# Patient Record
(demographics unavailable — no encounter records)

---

## 2025-03-07 NOTE — DISCUSSION/SUMMARY
[FreeTextEntry1] : A/P 65-year-old male with past medical history of dyslipidemia, diabetes (not on medications), Atrial fibrillation, [id number]: 1, status post ablation in 2019 at [facility], not on AC , last seen 6/2023 for preoperative cardiovascular assessment prior to cataract surgery. He has had 3 ER visit for chest pain. In April 2024 in [facility] underwent CCTA, mild plaque, In December 2024 he went to [facility] for chest pain and productive cough, TTE with LVEF70%, PVC/ NSVT. ESR CRP neg. was treated with Augmentin for sinusitis. Most recently went to [facility] with recurrent chest pains, Trop 15, A1c 7.5.   - non exertional chest pain, recent CCTA with minimal / mild CAD in 4/2024, normal LVEF on TTE, will order 4 week [facility]OT assess for pAFib / PVC/ NSVT, requires auth cannot be placed today.  continue ASA 81mg, pt had stopped it. mildly elevated LFT at [facility] check LFTs prior to resuming statin.  recommend diet modification, suggest trial PPI , pt does not want to take meds, advised to avoid spicy foods/ acidic foods avoid eating close to bedtime,  EP f/u -smoking history and cough- will refer to pulmonology - DM- untreated, advised f.u with PCP  , pt states establishing care with new PCP and labs to be drawn   All questions answered, pt verbalizes understanding of the above plan and is in agreement Advised to seek immediate medical attention for any new or concerning symptoms  f/u with Dr Beckman in 5 weeks Betty Bryson PA-C

## 2025-03-07 NOTE — HISTORY OF PRESENT ILLNESS
[FreeTextEntry1] : 65-year-old male with past medical history of dyslipidemia, diabetes (not on medications), Atrial fibrillation, [id number]: 1, status post ablation in 2019 at [facility], not on AC , last seen 6/2023 for preoperative cardiovascular assessment prior to cataract surgery. He has had 3 ER visit for chest pain. In April 2024 in [facility] underwent CCTA, mild plaque, In December 2024 he went to [facility] for chest pain and productive cough, TTE with LVEF70%, PVC/ NSVT. ESr CRP neg. was treated with Augmentin for sinusitis. Most recently went to [facility] with recurrent chest pains, Trop 15, A1c 7.5.   Pt reports intermittent chest pains when aggravated, or when laying down, sometimes feels he can burp it away. States the pain is mild. He admits to eating alot of red sauce / spicy foods although his portions/ intake is small.  He plays baseballs in the warm weather without any chest pain. Stopped smoking cigars in December and has been coughing a lot. He reports was asymptomatic when in Afib with [facility] in the past. Xarelto was stopped by EP in 2020. He has not followed up with EP since then and stopped BB on his own. he does not like to take meds.     Initial visit 2023 Patient notes that he has been doing well with no complaints of any chest pain shortness of breath at rest or upon exertion no dizziness lightheadedness no palpitations lower extremity edema orthopnea or PND, no syncope. Patient notes that he is not taking any medications at this time not on aspirin or blood thinners. Last saw electrophysiologist on 2/30/2020 for follow-up during that time in 2020 had an extended monitor which showed no recurrence of atrial fibrillation just short bursts of A. tach and SVTs, and so Xarelto was discontinued at that time.

## 2025-03-13 NOTE — HISTORY OF PRESENT ILLNESS
[TextBox_4] : 3/13/25  65M Atrial fibrillation - ablation 2019 DM and HLD Normal weight Echo OK in January of 2025 Recent complete metabolic panel [place] ED with SOB 1/10/25 - pulmonary evaluation advised  Scheduled for laparoscopic hernia repair by Dr Gamez 3/21/25 at [facility] CXR today and 1/10/25 - OK Stopped smoking about 12/18/24 - cough and RAMESH largely resolved after that Smoked cigarettes until age 40 and cigars thereafter until December.  Wife, Marisa, sees me for asthma

## 2025-03-13 NOTE — CONSULT LETTER
[Dear  ___] : Dear  [unfilled], [Consult Letter:] : I had the pleasure of evaluating your patient, [unfilled]. [Please see my note below.] : Please see my note below. [Consult Closing:] : Thank you very much for allowing me to participate in the care of this patient.  If you have any questions, please do not hesitate to contact me. [Sincerely,] : Sincerely, [DrJacque  ___] : Dr. REICH